# Patient Record
Sex: FEMALE | Race: WHITE | NOT HISPANIC OR LATINO | Employment: OTHER | URBAN - METROPOLITAN AREA
[De-identification: names, ages, dates, MRNs, and addresses within clinical notes are randomized per-mention and may not be internally consistent; named-entity substitution may affect disease eponyms.]

---

## 2021-09-25 ENCOUNTER — OFFICE VISIT (OUTPATIENT)
Dept: URGENT CARE | Age: 81
End: 2021-09-25
Payer: COMMERCIAL

## 2021-09-25 VITALS
HEIGHT: 66 IN | RESPIRATION RATE: 18 BRPM | BODY MASS INDEX: 29.99 KG/M2 | DIASTOLIC BLOOD PRESSURE: 76 MMHG | TEMPERATURE: 97.8 F | SYSTOLIC BLOOD PRESSURE: 182 MMHG | HEART RATE: 64 BPM | WEIGHT: 186.6 LBS

## 2021-09-25 DIAGNOSIS — H02.843 SWELLING OF RIGHT EYELID: Primary | ICD-10-CM

## 2021-09-25 PROCEDURE — 99203 OFFICE O/P NEW LOW 30 MIN: CPT | Performed by: FAMILY MEDICINE

## 2021-09-25 RX ORDER — POLYMYXIN B SULFATE AND TRIMETHOPRIM 1; 10000 MG/ML; [USP'U]/ML
1 SOLUTION OPHTHALMIC EVERY 4 HOURS
Qty: 10 ML | Refills: 0 | Status: SHIPPED | OUTPATIENT
Start: 2021-09-25 | End: 2021-09-30

## 2021-09-25 NOTE — PROGRESS NOTES
[facility]        NAME: Billy Ventura is a 79 y o  female  : 1940    MRN: [id number]  DATE: 2021  TIME: 11:37 AM    Assessment and Plan   Swelling of right eyelid [H02 843]  1  Swelling of right eyelid  polymyxin b-trimethoprim (POLYTRIM) ophthalmic solution     Advised to ice the right eye regularly for the next 3 days  If ineffective, may fill the antibiotic prescription  Patient Instructions     Follow up with PCP in 3-5 days  Proceed to  ER if symptoms worsen  Chief Complaint     Chief Complaint   Patient presents with    Facial Swelling     Son has noticed that his mother right eye is swollen- she states that it's tearing- denies eye having discharge- vision without glasses both eyes 20/40         History of Present Illness       78 yo F with 2 weeks right eye swelling which has improved over time  Presents today due to persistence  Concerned for possible bacterial infection due to a lot of watery discharge  Review of Systems   Review of Systems   Constitutional: Negative for chills and fever  Eyes: Positive for discharge and redness  Negative for photophobia, pain and visual disturbance  Respiratory: Negative for cough and shortness of breath  Cardiovascular: Negative for chest pain  Gastrointestinal: Negative for abdominal pain and rectal pain  Skin: Negative for rash  Neurological: Negative for dizziness and headaches           Current Medications       Current Outpatient Medications:     polymyxin b-trimethoprim (POLYTRIM) ophthalmic solution, Administer 1 drop to the right eye every 4 (four) hours for 5 days, Disp: 10 mL, Rfl: 0    Current Allergies     Allergies as of 2021    (No Known Allergies)            The following portions of the patient's history were reviewed and updated as appropriate: allergies, current medications, past family history, past medical history, past social history, past surgical history and problem list  History reviewed  No pertinent past medical history  History reviewed  No pertinent surgical history  History reviewed  No pertinent family history  Medications have been verified  Objective   BP (!) 182/76 (BP Location: Right arm, Patient Position: Sitting, Cuff Size: Standard)   Pulse 64   Temp 97 8 °F (36 6 °C) (Temporal)   Resp 18   Ht 5' 6" (1 676 m)   Wt 84 6 kg (186 lb 9 6 oz)   BMI 30 12 kg/m²   No LMP recorded  Physical Exam     Physical Exam  Vitals and nursing note reviewed  Constitutional:       General: She is in acute distress  Appearance: Normal appearance  She is obese  She is not ill-appearing, toxic-appearing or diaphoretic  HENT:      Head: Normocephalic and atraumatic  Eyes:      General:         Right eye: Discharge present  Left eye: No discharge  Extraocular Movements: Extraocular movements intact  Pupils: Pupils are equal, round, and reactive to light  Comments: Right conjunctiva inflamed  Right lower eyelid mildly edematous  Pulmonary:      Effort: Pulmonary effort is normal    Skin:     General: Skin is warm  Findings: No erythema  Neurological:      General: No focal deficit present  Mental Status: She is alert and oriented to person, place, and time  Psychiatric:         Mood and Affect: Mood normal          Behavior: Behavior normal          Thought Content:  Thought content normal          Judgment: Judgment normal

## 2023-07-10 ENCOUNTER — OFFICE VISIT (OUTPATIENT)
Dept: FAMILY MEDICINE CLINIC | Facility: CLINIC | Age: 83
End: 2023-07-10

## 2023-07-10 VITALS
DIASTOLIC BLOOD PRESSURE: 70 MMHG | BODY MASS INDEX: 24.66 KG/M2 | HEIGHT: 65 IN | TEMPERATURE: 98.1 F | WEIGHT: 148 LBS | SYSTOLIC BLOOD PRESSURE: 119 MMHG | HEART RATE: 68 BPM

## 2023-07-10 DIAGNOSIS — H61.21 IMPACTED CERUMEN OF RIGHT EAR: ICD-10-CM

## 2023-07-10 DIAGNOSIS — R41.3 MEMORY DIFFICULTIES: Primary | ICD-10-CM

## 2023-07-10 DIAGNOSIS — H91.93 HEARING DIFFICULTY OF BOTH EARS: ICD-10-CM

## 2023-07-10 DIAGNOSIS — F69 BEHAVIOR CONCERN IN ADULT: ICD-10-CM

## 2023-07-10 PROCEDURE — 99204 OFFICE O/P NEW MOD 45 MIN: CPT | Performed by: FAMILY MEDICINE

## 2023-07-10 NOTE — ASSESSMENT & PLAN NOTE
Noted by family, as well as noted during the visit elements of short-term memory  -We will start with the blood work including CBC, CMP, B12, folate  -Refer to Senior care for further evaluation and assistance
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
## 2023-07-10 NOTE — ASSESSMENT & PLAN NOTE
Hard of hearing exam  -Impacted cerumen on the right, will schedule for cerumen removal after course of Debrox  -Referred to audiology

## 2023-07-10 NOTE — ASSESSMENT & PLAN NOTE
Impacted cerumen on the right ear,  Debrox prescribed for 5 days  -Follow-up in 1 week for cerumen removal

## 2023-07-13 ENCOUNTER — TELEPHONE (OUTPATIENT)
Age: 83
End: 2023-07-13

## 2023-07-13 NOTE — TELEPHONE ENCOUNTER
[facility], [address] [place] [address] [address]    [phone]    Telephone Intake: Geriatric Assessment     - Chart Review  1. Has this patient been seen by our department in the last 3 years? No  2. Please route to provider for chart review prior to scheduling and let the caller know that this phone intake will be reviewed IF -  • Pt was recently hospitalized  • Pt is prescribed medications for behavior management or has a history of psychiatric hospitalization  • Pt plans to attend alone    Referral source: [facility] Sobia SAGASTUME who is scheduling/relationship to pt: Karin Cee Alexis phone number: [phone]    Reason for referral: Patient concerns , Family member concerns and Provider concerns regarding memory concerns, home safety concerns and mood concerns. If there are behavioral concerns, is the pt prescribed medications to manage these? no   If so, how many? 0   Has the patient ever had an inpatient psychiatric hospitalization? No  What is the goal of the visit? Assessment and Diagnosis, Plan of Care (both now and future)     Has the patient been seen by a Neurologist or Geriatrician? No   If yes, is this appointment for a second opinion? No  Has the patient ever been diagnosed with dementia? No  Has the patient had an MRI NeuroQuant within the last 1 year? No (If so, please route to provider to determine if assessment + conference are needed or if only assessment should be scheduled)      Preferred language? English  Highest education level? Associates Degree  Does the patient wear glasses? Yes   Does the patient use hearing aids? No     Is there a living will/healthcare POA in place/If so, who? Yes , Jean Lopez (son)    Does the pt/caregiver have access for a virtual visit (computer/smart phone with audio/video)?  Yes     Caller was informed:   • Please make sure the pt is accompanied by someone who knows them well / caregiver / family member to participate in this appointment. Who will accompany the pt (name and relationship)? Felicia Espana (son), Tristan Avelar (son)  Phone number of person accompanying pt: [phone]  • Please make sure the pt attends all appointments, including the assessment, care conference, follow-up, whether in-person or virtual.  • For virtual visits, pt must be physically present in [facility]. Office packet mailed out to: [id number] [address] [address] [id number]  Added to wait list for sooner appointments/notified that calls can be short notice (same day/day prior)?  No

## 2023-07-18 NOTE — PROGRESS NOTES
America Bliss [address], [address] [facility], [facility]  [phone]    Social Work [id number]  Feliciano Rizvi presents today for a geriatric assessment, accompanied by Chapin & Cherise Castillo. Patient was very agitated during the assessment as evidence by marina at [facility]. LSW stated that patient did not have to complete the assessment but agreed to. LSW was able to redirect, and provided instructions multiple times through out the assessment. Patient repetitively stated that she worked for [employer] for 40 years. Patient expressed frustration that LSW could not tell her how to complete the sections.     Maximo Cognitive Assessment (MoCA) Version 8.1  Education: Associates    Points Earned POSSIBLE Points   Visuospatial/Executive   Alternating Totz Making 0 1   Visuoconstructional skills 1 1   Visuoconstructional skills (clock) 1 3   Naming   Naming Animals 2 3   Attention   Digit Span 1 2   Vigilance (letters) 0 1   Serial 7 subtraction 1 3   Language   Sentence Repetition 1 2   Verbal fluency 0 1   Abstraction   Abstraction (word pairings) 0 2   Delayed recall   Delayed recall 0 5   Memory index score: /15   Orientation   Orientation 0 6   TOTAL SCORE: 7/30  (Normal ?26/30)   Additional notes:

## 2023-07-19 ENCOUNTER — OFFICE VISIT (OUTPATIENT)
Age: 83
End: 2023-07-19
Payer: COMMERCIAL

## 2023-07-19 VITALS
SYSTOLIC BLOOD PRESSURE: 118 MMHG | HEIGHT: 65 IN | TEMPERATURE: 98.6 F | WEIGHT: 147.6 LBS | OXYGEN SATURATION: 97 % | DIASTOLIC BLOOD PRESSURE: 64 MMHG | BODY MASS INDEX: 24.59 KG/M2 | HEART RATE: 67 BPM

## 2023-07-19 DIAGNOSIS — F02.A11 MILD LATE ONSET ALZHEIMER'S DEMENTIA WITH AGITATION (HCC): ICD-10-CM

## 2023-07-19 DIAGNOSIS — G30.1 MILD LATE ONSET ALZHEIMER'S DEMENTIA WITH AGITATION (HCC): ICD-10-CM

## 2023-07-19 PROBLEM — F69 BEHAVIOR CONCERN IN ADULT: Status: RESOLVED | Noted: 2023-07-10 | Resolved: 2023-07-19

## 2023-07-19 PROCEDURE — 99205 OFFICE O/P NEW HI 60 MIN: CPT | Performed by: INTERNAL MEDICINE
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
## 2023-07-19 NOTE — ASSESSMENT & PLAN NOTE
Clinical history and affect is consistent with Alzheimer's dementia. Patient has very poor insight into lack of memory abilities which will make counseling patient quite difficult. As well patient does have significant agitation prompted with any challenging of reality testing. Currently without any major safety concerns. Patient does not take medications does not drive and finances have been taking control of by family. Discussed risks of possible safety issue in the future with things such as wandering but does not seem very likely in the short future. We will need to continue counseling family and provide further education regarding Alzheimer's and options. Continue supportive home with home health aide. Decision-making capacity: Does not have capacity for financial or medical decisions. Staging: Mild Stage Alzheimer's Dementia (FAST stage IV)    Medications Review: No concerns    Caregiver Review: With significant caregiver stress.
Keep pt and family informed of any and all updates to careplan.

## 2023-09-05 NOTE — PROGRESS NOTES
[facility] [address], [address] [facility], [address] [place] [address] Loop  [phone]    Care Conference    NAME: Ghislaine Ulloa  AGE: 79 y.o. SEX: female  YOB: 1940  DATE OF ASSESSMENT: 7/19/23  DATE OF CONFERENCE: 9/12/23    Family Present: Eze Amaya  Staff Present: Dr. Kaushal Tyler    Patient / Family Goals of Care: Review cognitive decline and associated symptoms, discuss treatment options and care planning. 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 Patient has very poor insight into lack of memory abilities which will make counseling patient quite difficult. As well patient does have significant agitation prompted with any challenging of reality testing. Currently without any major safety concerns. Patient does not take medications, does not drive, and finances have been taken control of by family. Discussed risks of possible safety issue in the future with things such as wandering but does not seem very likely in the short term future. We will need to continue counseling family and provide further education regarding Alzheimer's and options. Continue supportive home with home health aide. Considering severe agitation/sundowning in the evening time, will start quetiapine 12.5 mg as needed  o [place] Cognitive Assessment: 7/30    · Decision-making capacity: Does not have capacity for financial or medical decisions.   · Staging: Mild Stage Alzheimer's Dementia (FAST stage IV)    • Remain active physically, mentally and socially  • Pharmaceutical and non-pharmaceutical interventions discussed  • Engage in cognitively challenging exercises such as crosswords and puzzles  • Maintain chronic conditions under control  • Repeat cognitive assessment in 6 months    Diagnostic Studies  • Review of bloodwork    Physical Finding Impacting Function   Fall Risk: low fall risk   Activities of Daily Living: Independent   Instrumental Activities of Daily Living: Dependent    • Encourage appropriate footwear at all times  • Review fall risk prevention tips and adjust within the home environment as needed    Medications Reviewed   • Medications seem appropriate for present conditions  • Check with PCP before using over the counter medications  • Avoid over the counter medications that can affect cognition (e.g., Benadryl, Tylenol PM)   • Avoid NSAIDs due to risk of GI bleed and renal impairment    Other Findings   Overall health  • BMI: 24.56 kg/m2  • Maintain well-balanced diet  • Continue following with primary care physician regularly    Recommended Health Maintenance   Immunizations, if not contraindicated:   • Influenza vaccine yearly  • Pneumo vaccine every 5 years (65 years and over)  • Shingles vaccine  • COVID-19 vaccine    Social / Safety Concerns  • Recommend ongoing planning for moving into memory care unit. If difficult, could consider an Adult Day Program for positive socialization, physical exercise, cognitive stimulation and family respite as alternative. • Consider a home care aide to assist with daily care needs prior to memory care unit. • Stay in touch with family and friends  • Plan self-care activities for your mental well-being each week  • Recommend contacting your local [address] [place] [address] on Aging for possible eligible programs such as OPTIONS, Caregiver Support Program, or [place] updated advance directives and provide a copy to your primary care provider  • Consider caregiver support groups and educational resources through the Alzheimer's Association; access Alzheimer's Association 24/7 Helpline at 3-623.429.4147  ?  [facility] does offer a monthly caregiver support group. If interested, please speak with a . • Utilize reorientation and redirection as needed (dependent on situation)  • Educational information provided  • Recommended literature: 36 Hour Day, Untangling Alzheimer's, Learning to Speak Alzheimer's    Patient and family verbalized understanding of above care plan. For care coordination purposes, this care plan will be shared with your primary care provider. With any questions, please contact our office at [phone].

## 2023-09-08 PROBLEM — H61.21 IMPACTED CERUMEN OF RIGHT EAR: Status: RESOLVED | Noted: 2023-07-10 | Resolved: 2023-09-08

## 2023-09-12 ENCOUNTER — TELEMEDICINE (OUTPATIENT)
Age: 83
End: 2023-09-12
Payer: COMMERCIAL

## 2023-09-12 DIAGNOSIS — F02.A11 MILD LATE ONSET ALZHEIMER'S DEMENTIA WITH AGITATION (HCC): Primary | Chronic | ICD-10-CM

## 2023-09-12 DIAGNOSIS — G30.1 MILD LATE ONSET ALZHEIMER'S DEMENTIA WITH AGITATION (HCC): Primary | Chronic | ICD-10-CM

## 2023-09-12 PROCEDURE — 99483 ASSMT & CARE PLN PT COG IMP: CPT | Performed by: INTERNAL MEDICINE

## 2023-09-12 RX ORDER — QUETIAPINE FUMARATE 25 MG/1
12.5 TABLET, FILM COATED ORAL DAILY PRN
Qty: 45 TABLET | Refills: 3 | Status: SHIPPED | OUTPATIENT
Start: 2023-09-12

## 2023-09-12 NOTE — ASSESSMENT & PLAN NOTE
Start quetiapine 12.5 mg daily as needed for severe agitation. If little effect, may increase to 25 mg (one whole pill) daily as needed.

## 2023-09-12 NOTE — PROGRESS NOTES
[facility]  [address] [facility], 751 [facility], [facility] [address]  [phone]    Care Conference: Resources Provided    LSW provided the following resources, along with a copy of care plan:  Alvino Piotr [place] [id number]    General Information  - 10 Ways to Love Your Brain  - Memory loss ladder  - Packet with Alzheimer's Association information including: definition of dementia, communication tips for different stages, common behaviors and response strategies  - NIH MARC "Now What?  Next Steps After an Alzheimer's Diagnosis"    Caregiver Support  - Flyer for [employer] [address] (meeting 2x per month by [employer])  - Alzheimer's Association Rx Pad (guide to website and 24/7 helpline, 8-941.753.7097)    Safety  - [facility] fall prevention / home safety [address] [place] [address] in [address] [place] [address] contains information on home care agencies, adult day centers, higher levels of care, and more  - [employer] List  - Adult [address] Senior [facility]: Caregiver Website QR code to scan for resources/education

## 2023-09-12 NOTE — PATIENT INSTRUCTIONS
[facility]  [address] [facility], [address] [place] [address], [facility] [address]  [phone]    Care Conference    NAME: Edgardo Paulson  AGE: 79 y.o. SEX: female  YOB: 1940  DATE OF ASSESSMENT: 7/19/23  DATE OF CONFERENCE: 9/12/23    Family Present: Cyndi Mccoy  Staff Present: Dr. Feli Foley    Patient / Family Goals of Care: Review cognitive decline and associated symptoms, discuss treatment options and care planning. 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 Patient has very poor insight into lack of memory abilities which will make counseling patient quite difficult. As well patient does have significant agitation prompted with any challenging of reality testing. Currently without any major safety concerns. Patient does not take medications, does not drive, and finances have been taken control of by family. Discussed risks of possible safety issue in the future with things such as wandering but does not seem very likely in the short term future. We will need to continue counseling family and provide further education regarding Alzheimer's and options. Continue supportive home with home health aide. Considering severe agitation/sundowning in the evening time, will start quetiapine 12.5 mg as needed  [place] Cognitive Assessment: 7/30    Decision-making capacity: Does not have capacity for financial or medical decisions.   Staging: Mild Stage Alzheimer's Dementia (FAST stage IV)    Remain active physically, mentally and socially  Pharmaceutical and non-pharmaceutical interventions discussed  Engage in cognitively challenging exercises such as crosswords and puzzles  Maintain chronic conditions under control  Repeat cognitive assessment in 6 months    Diagnostic Studies  Review of bloodwork    Physical Finding Impacting Function   Fall Risk: low fall risk   Activities of Daily Living: Independent   Instrumental Activities of Daily Living: Dependent    Encourage appropriate footwear at all times  Review fall risk prevention tips and adjust within the home environment as needed    Medications Reviewed   Medications seem appropriate for present conditions  Check with PCP before using over the counter medications  Avoid over the counter medications that can affect cognition (e.g., Benadryl, Tylenol PM)   Avoid NSAIDs due to risk of GI bleed and renal impairment    Other Findings   Overall health  BMI: 24.56 kg/m2  Maintain well-balanced diet  Continue following with primary care physician regularly    Recommended Health Maintenance   Immunizations, if not contraindicated: Influenza vaccine yearly  Pneumo vaccine every 5 years (65 years and over)  Shingles vaccine  COVID-19 vaccine    Social / Safety Concerns  Recommend ongoing planning for moving into memory care unit. If difficult, could consider an Adult Day Program for positive socialization, physical exercise, cognitive stimulation and family respite as alternative. Consider a home care aide to assist with daily care needs prior to memory care unit. Stay in touch with family and friends  Plan self-care activities for your mental well-being each week  Recommend contacting your local [address] [place] [address] on Aging for possible eligible programs such as OPTIONS, Caregiver Support Program, or [address] [place] [address],[place] [address] updated advance directives and provide a copy to your primary care provider  Consider caregiver support groups and educational resources through the Alzheimer's Association; access Alzheimer's Association 24/7 Helpline at [address] [facility] [facility] does offer a monthly caregiver support group.  If interested, please speak with a . Utilize reorientation and redirection as needed (dependent on situation)  Educational information provided  Recommended literature: 36 Hour Day, Untangling Alzheimer's, Learning to Speak Alzheimer's    Patient and family verbalized understanding of above care plan. For care coordination purposes, this care plan will be shared with your primary care provider. With any questions, please contact our office at [phone].

## 2023-09-18 ENCOUNTER — PATIENT OUTREACH (OUTPATIENT)
Dept: FAMILY MEDICINE CLINIC | Facility: CLINIC | Age: 83
End: 2023-09-18
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
## 2023-09-25 ENCOUNTER — TELEPHONE (OUTPATIENT)
Dept: FAMILY MEDICINE CLINIC | Facility: CLINIC | Age: 83
End: 2023-09-25

## 2023-09-25 NOTE — TELEPHONE ENCOUNTER
Folder (to be completed by): Dr. Eliecer Mittal    Name of Form: [address] Physician    Color Folder: Blue    Form to be faxed to:  [phone]

## 2023-09-28 NOTE — TELEPHONE ENCOUNTER
Dr Osman Castellanos completed paper work, faxed to [phone]. Placed in blue folder in the front to scan to patient chart.

## 2023-10-03 ENCOUNTER — TELEPHONE (OUTPATIENT)
Age: 83
End: 2023-10-03

## 2023-10-03 NOTE — TELEPHONE ENCOUNTER
Patient's son, Elmer Obrien ([phone]) called an needs a DME filled out for the patient. I advised I will need to check with provider and someone will get back to him. I suggested that I do have an appointment available for tomorrow (10/4/23 @ 10:00 am) . Roberto advised he probably cannot make that time. Sanna Masters did advise if provider cannot handle he would try to go to the PCP.

## 2023-10-04 ENCOUNTER — OFFICE VISIT (OUTPATIENT)
Age: 83
End: 2023-10-04
Payer: COMMERCIAL

## 2023-10-04 VITALS
TEMPERATURE: 97.5 F | HEIGHT: 65 IN | OXYGEN SATURATION: 97 % | SYSTOLIC BLOOD PRESSURE: 126 MMHG | HEART RATE: 62 BPM | WEIGHT: 149 LBS | BODY MASS INDEX: 24.83 KG/M2 | DIASTOLIC BLOOD PRESSURE: 62 MMHG

## 2023-10-04 DIAGNOSIS — G30.1 MILD LATE ONSET ALZHEIMER'S DEMENTIA WITH AGITATION (HCC): Primary | Chronic | ICD-10-CM

## 2023-10-04 DIAGNOSIS — F02.A11 MILD LATE ONSET ALZHEIMER'S DEMENTIA WITH AGITATION (HCC): Primary | Chronic | ICD-10-CM

## 2023-10-04 PROCEDURE — 99214 OFFICE O/P EST MOD 30 MIN: CPT | Performed by: INTERNAL MEDICINE

## 2023-10-04 RX ORDER — QUETIAPINE FUMARATE 25 MG/1
25 TABLET, FILM COATED ORAL EVERY 6 HOURS PRN
Qty: 60 TABLET | Refills: 3 | Status: SHIPPED | OUTPATIENT
Start: 2023-10-04

## 2023-10-04 NOTE — PROGRESS NOTES
GERIATRICS CONSULTATION    ASSESSMENT AND PLAN:   1. 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 Very likely will have severe agitation with transition which she will not be able to understand. Recommend quetiapine 25 mg q6h as needed for severe agitation (which is highly likely) in the facility. Quetiapine at 12.5 mg only had mild effects at home. As well, DME form filled out today during the visit. Orders:  -     QUEtiapine (SEROquel) 25 mg tablet; Take 1 tablet (25 mg total) by mouth every 6 (six) hours as needed (severe agitation)        HPI:   Patient seen with Dr. Lisa Aranda.  We had the pleasure of evaluating Ravin Arboleda who is a 79 y.o. female in Geriatric follow up today. Ms. Black Venegas is in the office with her son. She lives with her son. Patient agitated during visit today, unclear why she is here. ROS: Review of Systems   Unable to perform ROS: Dementia       No Known Allergies    Medications:    Current Outpatient Medications on File Prior to Visit   Medication Sig Dispense Refill   • [DISCONTINUED] QUEtiapine (SEROquel) 25 mg tablet Take 0.5 tablets (12.5 mg total) by mouth daily as needed (severe agitation) (Patient not taking: Reported on 10/4/2023) 45 tablet 3     No current facility-administered medications on file prior to visit. 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   Family History   Problem Relation Age of Onset   • Alzheimer's disease Sister      Social History     Socioeconomic History   • Marital status:      Spouse name: Not on file   • Number of children: Not on file   • Years of education: Not on file   • Highest education level: Not on file   Occupational History   • Not on file   Tobacco Use   • Smoking status: Former     Types: Cigarettes   • Smokeless tobacco: Never Substance and Sexual Activity   • Alcohol use: Not Currently   • Drug use: Never   • Sexual activity: Not Currently   Other Topics Concern   • Not on file   Social History Narrative    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 [employer]     Social Determinants of [place]: Low Risk  (7/10/2023)    Overall Financial Resource Strain (CARDIA)    • Difficulty of Paying Living Expenses: Not hard at all   Food Insecurity: No Food Insecurity (7/10/2023)    Hunger Vital Sign    • Worried About Running Out of Food in the Last Year: Never true    • Ran Out of Food in the Last Year: Never true   Transportation Needs: No Transportation Needs (7/10/2023)    PRAPARE - Transportation    • Lack of Transportation (Medical): No    • Lack of Transportation (Non-Medical): No   Physical Activity: Not on file   Stress: Not on file   Social Connections: Not on file   Intimate Partner Violence: Not on file   Housing Stability: Not on file     No past surgical history on file. OBJECTIVE:   Vitals:    10/04/23 1012   BP: 126/62   BP Location: Left arm   Pulse: 62   Temp: 97.5 °F (36.4 °C)   SpO2: 97%   Weight: 67.6 kg (149 lb)   Height: 5' 5" (1.651 m)     Body mass index is 24.79 kg/m². Physical Exam  Constitutional:       Appearance: Normal appearance. HENT:      Head: Normocephalic and atraumatic. Nose: Nose normal.      Mouth/Throat:      Mouth: Mucous membranes are moist.   Eyes:      General: No scleral icterus. Right eye: No discharge. Left eye: No discharge. Conjunctiva/sclera: Conjunctivae normal.   Pulmonary:      Effort: Pulmonary effort is normal. No respiratory distress. Abdominal:      General: There is no distension. Skin:     Coloration: Skin is not pale. Findings: No erythema. Neurological:      General: No focal deficit present. Mental Status: She is alert. Psychiatric:      Comments: Agitated. Poor insight.      I have spent a total time of 30 minutes on 10/04/23 in caring for this patient including Prognosis, Risks and benefits of tx options, Impressions, Counseling / Coordination of care, Documenting in the medical record and Reviewing / ordering tests, medicine, procedures  .

## 2023-10-04 NOTE — ASSESSMENT & PLAN NOTE
Now planning for transition to memory care. Very likely will have severe agitation with transition which she will not be able to understand. Recommend quetiapine 25 mg q6h as needed for severe agitation (which is highly likely) in the facility. Quetiapine at 12.5 mg only had mild effects at home. As well, DME form filled out today during the visit.

## 2023-10-10 ENCOUNTER — PATIENT OUTREACH (OUTPATIENT)
Dept: FAMILY MEDICINE CLINIC | Facility: CLINIC | Age: 83
End: 2023-10-10

## 2023-10-10 NOTE — PROGRESS NOTES
Chart review completed. Per chart, pt's family are looking into moving pt into memory care. Per chart, pt has been established with [facility]. 2nd outreach to pt's son Seeleys Crawford was successful. Yonathan Calles reports they are moving pt into memory care facility this Thursday. OP SWCM advised Roberto to outreach SW or PCP office with any concerns. Referral to be closed at this time.

## 2024-03-06 ENCOUNTER — TELEPHONE (OUTPATIENT)
Age: 84
End: 2024-03-06

## 2024-03-06 NOTE — TELEPHONE ENCOUNTER
Left message with patient's son, Roberto Capone ([phone]) regarding cancelled follow up appointment for 3/13/2024 @ 9:00 am via [employer].  Patient states:  Unhappy/Changed Provider     Left message to confirm cancelled appointment or to call back to reschedule.